# Patient Record
Sex: MALE | Race: WHITE | Employment: FULL TIME | URBAN - METROPOLITAN AREA
[De-identification: names, ages, dates, MRNs, and addresses within clinical notes are randomized per-mention and may not be internally consistent; named-entity substitution may affect disease eponyms.]

---

## 2021-01-28 ENCOUNTER — HOSPITAL ENCOUNTER (EMERGENCY)
Facility: CLINIC | Age: 31
Discharge: HOME | End: 2021-01-28
Attending: PHYSICIAN ASSISTANT | Admitting: PHYSICIAN ASSISTANT
Payer: COMMERCIAL

## 2021-01-28 VITALS
WEIGHT: 150 LBS | BODY MASS INDEX: 22.22 KG/M2 | OXYGEN SATURATION: 97 % | SYSTOLIC BLOOD PRESSURE: 125 MMHG | DIASTOLIC BLOOD PRESSURE: 88 MMHG | RESPIRATION RATE: 16 BRPM | HEART RATE: 86 BPM | HEIGHT: 69 IN | TEMPERATURE: 98.3 F

## 2021-01-28 DIAGNOSIS — S81.811A LACERATION OF RIGHT LOWER LEG, INITIAL ENCOUNTER: ICD-10-CM

## 2021-01-28 PROCEDURE — G0463 HOSPITAL OUTPT CLINIC VISIT: HCPCS | Mod: 25 | Performed by: PHYSICIAN ASSISTANT

## 2021-01-28 PROCEDURE — 99203 OFFICE O/P NEW LOW 30 MIN: CPT | Mod: 25 | Performed by: PHYSICIAN ASSISTANT

## 2021-01-28 PROCEDURE — 12002 RPR S/N/AX/GEN/TRNK2.6-7.5CM: CPT | Performed by: PHYSICIAN ASSISTANT

## 2021-01-28 ASSESSMENT — MIFFLIN-ST. JEOR: SCORE: 1640.78

## 2021-01-28 NOTE — ED PROVIDER NOTES
"  History     Chief Complaint   Patient presents with     Laceration     c/o right leg lac with ice auger,  tdap 2018     HPI  Joe David is a 30 year old male who presents to the  with concern over laceration to the posterior right calf which occurred less than 1 hour prior to arrival.  Patient was ice fishing when he reports that the auger he was using cut through both pairs of his pants lacerating his leg.  He denies any significant pain in the area.  No concern for foreign body embedded in the wound. He has been able to ambulate without difficulty.  He denies any distal numbness or paresthesias.   His tetanus vaccine is up-to-date.    Allergies:  Allergies   Allergen Reactions     Cefaclor        Problem List:    There are no active problems to display for this patient.       Past Medical History:    No past medical history on file.    Past Surgical History:    No past surgical history on file.    Family History:    No family history on file.    Social History:  Marital Status:  Single [1]  Social History     Tobacco Use     Smoking status: Not on file   Substance Use Topics     Alcohol use: Not on file     Drug use: Not on file        Medications:    No current outpatient medications on file.    Review of Systems  INTEGUMENTARY/SKIN: POSITIVE for laceration to right lower leg NEGATIVE for ecchymosis, worrisome rahes   MUSCULOSKELETAL: NEGATIVE for significant arthralgias or myalgia  NEURO:  NEGATIVE for lower extremity numbness or paresthesias   Physical Exam   BP: 125/88  Pulse: 86  Temp: 98.3  F (36.8  C)  Resp: 16  Height: 175.3 cm (5' 9\")  Weight: 68 kg (150 lb)  SpO2: 97 %  Physical Exam  HENT:      Head: Normocephalic and atraumatic.   Musculoskeletal:      Right knee: Normal.      Right ankle: Normal.      Right lower leg: He exhibits laceration. He exhibits no tenderness, no bony tenderness, no swelling and no deformity. No edema.        Legs:       Comments: 3 cm  Horizontal subcutaneous " laceration to the posterior aspect of the right calf   Skin:     General: Skin is warm and dry.      Findings: Laceration present. No abrasion, bruising, erythema or rash.   Neurological:      Mental Status: He is alert.      Sensory: No sensory deficit.       ED [facility]     -Laceration Repair  Performed by: Claudette Sherman PA-C  Authorized by: Claudette Sherman PA-C     LACERATION DETAILS     Location:  Leg    Leg location:  R lower leg    Length (cm):  3    REPAIR TYPE:     Repair type:  Simple      EXPLORATION:     Hemostasis achieved with:  Direct pressure    Wound exploration: wound explored through full range of motion and entire depth of wound probed and visualized      Wound extent: foreign bodies/material      Foreign bodies/material:  Clothing fibers    TREATMENT:     Area cleansed with:  Hibiclens    Amount of cleaning:  Standard    Visualized foreign bodies/material removed: yes      SKIN REPAIR     Repair method:  Sutures    Suture size:  4-0    Suture material:  Nylon    Suture technique:  Simple interrupted    Number of sutures:  5    APPROXIMATION     Approximation:  Close    POST-PROCEDURE DETAILS     Dressing:  Antibiotic ointment and sterile dressing              Critical Care time:  none        No results found for this or any previous visit (from the past 24 hour(s)).    Medications - No data to display    Assessments & Plan (with Medical Decision Making)     I have reviewed the nursing notes.  I have reviewed the findings, diagnosis, plan and need for follow up with the patient.     There are no discharge medications for this patient.    Final diagnoses:   Laceration of right lower leg, initial encounter     30-year-old male presents to the urgent care with concern over laceration to his right lower leg which occurred less than 1 hour prior to arrival when he cut it on an ice auger while fishing.  He had stable vital signs upon arrival.  Physical exam  findings significant for 3 cm subcutaneous laceration to the posterior calf, distal neurovascular status was intact.  After discussing her/benefits patient agreed to proceed with primary closure of his wound.  He tolerated placement of five 4-0 Ethilon sutures without immediate complications.  Follow up for suture removal in 10 days.  Suture care instructions, signs of infection, worrisome reasons to return to ER/UC sooner discussed.     Disclaimer: This note consists of symbols derived from keyboarding, dictation, and/or voice recognition software. As a result, there may be errors in the script that have gone undetected.  Please consider this when interpreting information found in the chart.     1/28/2021   [facility] EMERGENCY DEPT     Claudette Sherman PA-C  01/28/21 [id number]